# Patient Record
Sex: MALE | Race: ASIAN | URBAN - METROPOLITAN AREA
[De-identification: names, ages, dates, MRNs, and addresses within clinical notes are randomized per-mention and may not be internally consistent; named-entity substitution may affect disease eponyms.]

---

## 2022-02-18 ENCOUNTER — OFFICE VISIT (OUTPATIENT)
Dept: INTERNAL MEDICINE CLINIC | Facility: CLINIC | Age: 37
End: 2022-02-18
Payer: COMMERCIAL

## 2022-02-18 VITALS
SYSTOLIC BLOOD PRESSURE: 132 MMHG | BODY MASS INDEX: 26.67 KG/M2 | RESPIRATION RATE: 12 BRPM | DIASTOLIC BLOOD PRESSURE: 86 MMHG | WEIGHT: 178 LBS | TEMPERATURE: 99 F | HEIGHT: 68.5 IN | HEART RATE: 84 BPM

## 2022-02-18 DIAGNOSIS — Z23 NEED FOR VACCINATION: ICD-10-CM

## 2022-02-18 DIAGNOSIS — K76.0 FATTY LIVER: ICD-10-CM

## 2022-02-18 DIAGNOSIS — R74.8 ELEVATED LIVER ENZYMES: ICD-10-CM

## 2022-02-18 DIAGNOSIS — Z87.891 PERSONAL HISTORY OF TOBACCO USE, PRESENTING HAZARDS TO HEALTH: ICD-10-CM

## 2022-02-18 DIAGNOSIS — E78.5 HYPERLIPIDEMIA, UNSPECIFIED HYPERLIPIDEMIA TYPE: ICD-10-CM

## 2022-02-18 DIAGNOSIS — S16.1XXA STRAIN OF NECK MUSCLE, INITIAL ENCOUNTER: ICD-10-CM

## 2022-02-18 DIAGNOSIS — Z00.00 PHYSICAL EXAM, ANNUAL: Primary | ICD-10-CM

## 2022-02-18 PROCEDURE — 90472 IMMUNIZATION ADMIN EACH ADD: CPT | Performed by: INTERNAL MEDICINE

## 2022-02-18 PROCEDURE — 90471 IMMUNIZATION ADMIN: CPT | Performed by: INTERNAL MEDICINE

## 2022-02-18 PROCEDURE — 99385 PREV VISIT NEW AGE 18-39: CPT | Performed by: INTERNAL MEDICINE

## 2022-02-18 PROCEDURE — 99406 BEHAV CHNG SMOKING 3-10 MIN: CPT | Performed by: INTERNAL MEDICINE

## 2022-02-18 PROCEDURE — 3079F DIAST BP 80-89 MM HG: CPT | Performed by: INTERNAL MEDICINE

## 2022-02-18 PROCEDURE — 90746 HEPB VACCINE 3 DOSE ADULT IM: CPT | Performed by: INTERNAL MEDICINE

## 2022-02-18 PROCEDURE — 3008F BODY MASS INDEX DOCD: CPT | Performed by: INTERNAL MEDICINE

## 2022-02-18 PROCEDURE — 90715 TDAP VACCINE 7 YRS/> IM: CPT | Performed by: INTERNAL MEDICINE

## 2022-02-18 PROCEDURE — 3075F SYST BP GE 130 - 139MM HG: CPT | Performed by: INTERNAL MEDICINE

## 2022-02-27 LAB
ABSOLUTE BASOPHILS: 101 CELLS/UL (ref 0–200)
ABSOLUTE EOSINOPHILS: 455 CELLS/UL (ref 15–500)
ABSOLUTE LYMPHOCYTES: 4131 CELLS/UL (ref 850–3900)
ABSOLUTE MONOCYTES: 677 CELLS/UL (ref 200–950)
ABSOLUTE NEUTROPHILS: 4737 CELLS/UL (ref 1500–7800)
ALBUMIN/GLOBULIN RATIO: 1.6 (CALC) (ref 1–2.5)
ALBUMIN: 4.8 G/DL (ref 3.6–5.1)
ALKALINE PHOSPHATASE: 80 U/L (ref 36–130)
ALT: 34 U/L (ref 9–46)
AST: 21 U/L (ref 10–40)
BASOPHILS: 1 %
BILIRUBIN, TOTAL: 0.4 MG/DL (ref 0.2–1.2)
BUN: 13 MG/DL (ref 7–25)
CALCIUM: 10.4 MG/DL (ref 8.6–10.3)
CARBON DIOXIDE: 28 MMOL/L (ref 20–32)
CHLORIDE: 103 MMOL/L (ref 98–110)
CHOL/HDLC RATIO: 4.6 (CALC)
CREATININE: 0.87 MG/DL (ref 0.6–1.35)
EGFR IF AFRICN AM: 128 ML/MIN/1.73M2
EGFR IF NONAFRICN AM: 110 ML/MIN/1.73M2
EOSINOPHILS: 4.5 %
GLOBULIN: 3 G/DL (CALC) (ref 1.9–3.7)
GLUCOSE: 84 MG/DL (ref 65–99)
HDL CHOLESTEROL: 50 MG/DL
HEMATOCRIT: 49.3 % (ref 38.5–50)
HEMOGLOBIN A1C: 5.2 % OF TOTAL HGB
HEMOGLOBIN: 16.5 G/DL (ref 13.2–17.1)
LDL-CHOLESTEROL: 142 MG/DL (CALC)
LYMPHOCYTES: 40.9 %
MCH: 31 PG (ref 27–33)
MCHC: 33.5 G/DL (ref 32–36)
MCV: 92.7 FL (ref 80–100)
MONOCYTES: 6.7 %
MPV: 11.1 FL (ref 7.5–12.5)
NEUTROPHILS: 46.9 %
NON-HDL CHOLESTEROL: 178 MG/DL (CALC)
PLATELET COUNT: 310 THOUSAND/UL (ref 140–400)
POTASSIUM: 5.3 MMOL/L (ref 3.5–5.3)
PROTEIN, TOTAL: 7.8 G/DL (ref 6.1–8.1)
RDW: 12.6 % (ref 11–15)
RED BLOOD CELL COUNT: 5.32 MILLION/UL (ref 4.2–5.8)
SODIUM: 140 MMOL/L (ref 135–146)
TRIGLYCERIDES: 215 MG/DL
TSH W/REFLEX TO FT4: 1.09 MIU/L (ref 0.4–4.5)
WHITE BLOOD CELL COUNT: 10.1 THOUSAND/UL (ref 3.8–10.8)

## 2022-04-15 ENCOUNTER — NURSE ONLY (OUTPATIENT)
Dept: INTERNAL MEDICINE CLINIC | Facility: CLINIC | Age: 37
End: 2022-04-15
Payer: COMMERCIAL

## 2022-04-15 DIAGNOSIS — Z23 NEED FOR VACCINATION: Primary | ICD-10-CM

## 2022-04-15 PROCEDURE — 90746 HEPB VACCINE 3 DOSE ADULT IM: CPT | Performed by: INTERNAL MEDICINE

## 2022-04-15 PROCEDURE — 90471 IMMUNIZATION ADMIN: CPT | Performed by: INTERNAL MEDICINE

## 2022-11-02 ENCOUNTER — ORDER TRANSCRIPTION (OUTPATIENT)
Dept: ADMINISTRATIVE | Facility: HOSPITAL | Age: 37
End: 2022-11-02

## 2022-11-02 DIAGNOSIS — Z13.6 SCREENING FOR CARDIOVASCULAR CONDITION: Primary | ICD-10-CM

## 2022-11-06 ENCOUNTER — HOSPITAL ENCOUNTER (OUTPATIENT)
Dept: CT IMAGING | Age: 37
Discharge: HOME | End: 2022-11-06
Attending: INTERNAL MEDICINE

## 2022-11-06 DIAGNOSIS — Z13.6 SCREENING FOR CARDIOVASCULAR CONDITION: ICD-10-CM

## 2022-11-08 ENCOUNTER — ORDER TRANSCRIPTION (OUTPATIENT)
Dept: ADMINISTRATIVE | Facility: HOSPITAL | Age: 37
End: 2022-11-08

## 2022-11-08 DIAGNOSIS — Z13.9 ENCOUNTER FOR SCREENING: Primary | ICD-10-CM

## 2022-11-08 DIAGNOSIS — Z13.9 SCREENING PROCEDURE: Primary | ICD-10-CM

## 2023-02-17 ENCOUNTER — HOSPITAL ENCOUNTER (OUTPATIENT)
Dept: ULTRASOUND IMAGING | Age: 38
Discharge: HOME | End: 2023-02-17
Attending: INTERNAL MEDICINE

## 2023-02-17 DIAGNOSIS — Z13.6 SCREENING FOR CARDIOVASCULAR CONDITION: ICD-10-CM

## 2023-02-17 NOTE — PROGRESS NOTES
Date of Service 2/17/2023    Brenda Mock  Date of Birth 6/15/1985    Patient Age: 44year old    PCP: Alonzo Keen MD  [address] [id number]    Consult Type  Type Scan/Screening: PV Screening (Abdominal Aorta - Normal, Final result)        Left Carotid Artery: Normal (Final Result)  Right Carotid Artery: Normal (Final Result)       Body Mass Index  There is no height or weight on file to calculate BMI. Lipid Profile  Cholesterol: 228, done on 2/26/2022. HDL Cholesterol: 50, done on 2/26/2022. LDL Cholesterol: 142, done on 2/26/2022. TriGlycerides 215, done on 2/26/2022. He is due for his yearly physical and lab work. Nurse Review  Risk factor information and results reviewed with Nurse: Yes     Work on reducing your LDL and Triglycerides from your cholesterol - diet and exercise. Quit smoking completely and reduce the amount of alcohol. Recommended Follow Up:  Consult your physician regarding[de-identified] Final Peripheral Vascular Stroke Screen Report    No data recorded      Recommendations for Change:  Nutrition Changes: Low Saturated Fat;Low Fat Dairy  Cholesterol Modification (goal of therapy depends upon your risk): Decrease LDL (Lousy/Bad) Ideal <100;Decrease Triglycerides (Ugly) Normal <150  Exercise: Enhance Current Program  Smoking Cessation: Not Ready to Quit  Weight Management: Decrease Current Weight  Stress Management: Adopt Stress Management Techniques  Repeat Heart Scan: 5 years if Calcium Score is 0. 0; Discuss with your Physician  Repeat PV Screening:  (Could repeat this closer to 47years old.)       [facility] Recommended Resources:  Recommended Resources: [address] www. Health. Nicolle Jackson RN        Please Contact the Nurse Heart Line with any Questions or Concerns [phone].

## 2023-09-08 ENCOUNTER — OFFICE VISIT (OUTPATIENT)
Dept: INTERNAL MEDICINE CLINIC | Facility: CLINIC | Age: 38
End: 2023-09-08
Payer: COMMERCIAL

## 2023-09-08 VITALS
HEIGHT: 68.75 IN | SYSTOLIC BLOOD PRESSURE: 130 MMHG | DIASTOLIC BLOOD PRESSURE: 88 MMHG | RESPIRATION RATE: 20 BRPM | BODY MASS INDEX: 26.36 KG/M2 | OXYGEN SATURATION: 98 % | WEIGHT: 178 LBS | HEART RATE: 78 BPM | TEMPERATURE: 98 F

## 2023-09-08 DIAGNOSIS — Z00.00 PHYSICAL EXAM, ANNUAL: Primary | ICD-10-CM

## 2023-09-08 PROCEDURE — 3079F DIAST BP 80-89 MM HG: CPT | Performed by: INTERNAL MEDICINE

## 2023-09-08 PROCEDURE — 99395 PREV VISIT EST AGE 18-39: CPT | Performed by: INTERNAL MEDICINE

## 2023-09-08 PROCEDURE — 3075F SYST BP GE 130 - 139MM HG: CPT | Performed by: INTERNAL MEDICINE

## 2023-09-08 PROCEDURE — 3008F BODY MASS INDEX DOCD: CPT | Performed by: INTERNAL MEDICINE

## 2023-09-27 LAB
ABSOLUTE BASOPHILS: 60 CELLS/UL (ref 0–200)
ABSOLUTE EOSINOPHILS: 289 CELLS/UL (ref 15–500)
ABSOLUTE LYMPHOCYTES: 3706 CELLS/UL (ref 850–3900)
ABSOLUTE MONOCYTES: 697 CELLS/UL (ref 200–950)
ABSOLUTE NEUTROPHILS: 3749 CELLS/UL (ref 1500–7800)
ALBUMIN/GLOBULIN RATIO: 1.7 (CALC) (ref 1–2.5)
ALBUMIN: 4.7 G/DL (ref 3.6–5.1)
ALKALINE PHOSPHATASE: 75 U/L (ref 36–130)
ALT: 39 U/L (ref 9–46)
AST: 30 U/L (ref 10–40)
BASOPHILS: 0.7 %
BILIRUBIN, TOTAL: 0.7 MG/DL (ref 0.2–1.2)
BUN: 14 MG/DL (ref 7–25)
CALCIUM: 10.1 MG/DL (ref 8.6–10.3)
CARBON DIOXIDE: 31 MMOL/L (ref 20–32)
CHLORIDE: 104 MMOL/L (ref 98–110)
CHOL/HDLC RATIO: 4.8 (CALC)
CHOLESTEROL, TOTAL: 210 MG/DL
CREATININE: 0.95 MG/DL (ref 0.6–1.26)
EGFR: 104 ML/MIN/1.73M2
EOSINOPHILS: 3.4 %
GLOBULIN: 2.8 G/DL (CALC) (ref 1.9–3.7)
GLUCOSE: 85 MG/DL (ref 65–99)
HDL CHOLESTEROL: 44 MG/DL
HEMATOCRIT: 49.2 % (ref 38.5–50)
HEMOGLOBIN A1C: 5.1 % OF TOTAL HGB
HEMOGLOBIN: 16.2 G/DL (ref 13.2–17.1)
LDL-CHOLESTEROL: 127 MG/DL (CALC)
LYMPHOCYTES: 43.6 %
MCH: 30.3 PG (ref 27–33)
MCHC: 32.9 G/DL (ref 32–36)
MCV: 92 FL (ref 80–100)
MONOCYTES: 8.2 %
MPV: 12.2 FL (ref 7.5–12.5)
NEUTROPHILS: 44.1 %
NON-HDL CHOLESTEROL: 166 MG/DL (CALC)
PLATELET COUNT: 254 THOUSAND/UL (ref 140–400)
POTASSIUM: 4.9 MMOL/L (ref 3.5–5.3)
PROTEIN, TOTAL: 7.5 G/DL (ref 6.1–8.1)
RDW: 12.5 % (ref 11–15)
RED BLOOD CELL COUNT: 5.35 MILLION/UL (ref 4.2–5.8)
SODIUM: 142 MMOL/L (ref 135–146)
TRIGLYCERIDES: 240 MG/DL
TSH W/REFLEX TO FT4: 1.22 MIU/L (ref 0.4–4.5)
WHITE BLOOD CELL COUNT: 8.5 THOUSAND/UL (ref 3.8–10.8)

## 2024-09-13 ENCOUNTER — OFFICE VISIT (OUTPATIENT)
Dept: INTERNAL MEDICINE CLINIC | Facility: CLINIC | Age: 39
End: 2024-09-13
Payer: COMMERCIAL

## 2024-09-13 VITALS
WEIGHT: 166 LBS | DIASTOLIC BLOOD PRESSURE: 60 MMHG | OXYGEN SATURATION: 98 % | HEART RATE: 64 BPM | RESPIRATION RATE: 16 BRPM | SYSTOLIC BLOOD PRESSURE: 110 MMHG | BODY MASS INDEX: 24.59 KG/M2 | HEIGHT: 69 IN

## 2024-09-13 DIAGNOSIS — M94.0 COSTOCHONDRITIS: ICD-10-CM

## 2024-09-13 DIAGNOSIS — Z01.84 IMMUNITY STATUS TESTING: ICD-10-CM

## 2024-09-13 DIAGNOSIS — Z00.00 PHYSICAL EXAM, ANNUAL: Primary | ICD-10-CM

## 2024-09-13 PROCEDURE — 99213 OFFICE O/P EST LOW 20 MIN: CPT | Performed by: INTERNAL MEDICINE

## 2024-09-13 PROCEDURE — 99396 PREV VISIT EST AGE 40-64: CPT | Performed by: INTERNAL MEDICINE

## 2024-09-13 NOTE — PROGRESS NOTES
[facility]    CHIEF COMPLAINT:   Chief Complaint   Patient presents with    Physical     Eye exam done          HPI:   Francesco Polanco is a 39 year old male who presents for a complete physical exam.     Up to date tdap.   Get covid booster at his local pharmacy.     Exercise: healthclub, weights    Derm: no skin concerns.     Has a history of hyperlipidemia. Diet controlled.   Exercises off and on, nothing regular.      Smokes about 2-3 cigarettes a day. Has for 15 years. Stops off and on, does not smoke around kids.   Drinking 2-3 drinks 2-3 times a week.     Calcium score 0 in 11/2022    Complains of pain in right lower chest wall every so often. Usually a sharp pain when he moves in a certain direction or changes position in bed. Lasts a second or two and goes away. No pain when exercising. No pain today.     Wt Readings from Last 6 Encounters:   09/13/24 166 lb (75.3 kg)   09/08/23 178 lb (80.7 kg)   02/18/22 178 lb (80.7 kg)     Body mass index is 24.51 kg/m².       No current outpatient medications on file.      Past Medical History:    Hyperlipidemia    diet controlled      Past Surgical History:   Procedure Laterality Date    Other surgical history  [id number]    surgery on left humerus. Had plate placed and now removed      No family history on file.   Social History:   Social History     Socioeconomic History    Marital status:    Occupational History    Occupation: IT   Tobacco Use    Smoking status: Every Day     Current packs/day: 0.20     Average packs/day: 0.2 packs/day for 15.0 years (3.0 ttl pk-yrs)     Types: Cigarettes    Smokeless tobacco: Never    Tobacco comments:     2-3 ciggaretes a day for 15 years   Vaping Use    Vaping status: Never Used   Substance and Sexual Activity    Alcohol use: Yes     Comment: approx 2-3 drinks 2-3 times per week    Drug use: Never   Other Topics Concern    Exercise Yes     Comment: irregular exercise     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    Exercise:  weights .  Diet: watches minimally     REVIEW OF SYSTEMS:   GENERAL: feels well otherwise  SKIN: denies any unusual skin lesions  EYES:denies blurred vision or double vision  HEENT: denies nasal congestion, sinus pain or ST  LUNGS: denies shortness of breath with exertion  CARDIOVASCULAR: denies chest pain on exertion  GI: denies abdominal pain,denies heartburn  : denies dysuria  MUSCULOSKELETAL: denies back pain  NEURO: denies headaches  PSYCHE: denies depression or anxiety  HEMATOLOGIC: denies hx of anemia  ENDOCRINE: denies thyroid history  ALL/ASTHMA: denies hx of allergy or asthma  EXAM:   /60 (BP Location: Right arm, Patient Position: Sitting, Cuff Size: adult)   Pulse 64   Resp 16   Ht 5' 9\" (1.753 m)   Wt 166 lb (75.3 kg)   SpO2 98%   BMI 24.51 kg/m²   Body mass index is 24.51 kg/m².   GENERAL: well developed, well nourished,in no apparent distress  SKIN: no rashes,no suspicious lesions  HEENT: atraumatic, normocephalic,ears and throat are clear  EYES:PERRLA, conjunctiva are clear  NECK: supple,no adenopathy,no bruits  CHEST: has reproducible tenderness to palpation of right lower rib just adjacent to the lower end of the sternum.   LUNGS: clear to auscultation  CARDIO: nl s1 and s2, RRR without murmur  GI: good BS's,no masses, HSM or tenderness  GENITAL/URINARY:  deferred. No symptoms.   MUSCULOSKELETAL: back is not tender,FROM of the back  EXTREMITIES: no cyanosis, clubbing or edema  NEURO: Oriented times three,cranial nerves are intact,motor and sensory are grossly intact  Labs:   Lab Results   Component Value Date/Time    WBC 8.5 09/26/2023 06:58 AM    HGB 16.2 09/26/2023 06:58 AM     09/26/2023 06:58 AM      Lab Results   Component Value Date/Time    GLU 85 09/26/2023 06:58 AM     09/26/2023 06:58 AM    K 4.9 09/26/2023 06:58 AM     09/26/2023 06:58 AM    CO2 31 09/26/2023 06:58 AM    CREATSERUM 0.95 09/26/2023 06:58 AM    CA 10.1 09/26/2023 06:58 AM    ALB  4.7 09/26/2023 06:58 AM    TP 7.5 09/26/2023 06:58 AM    ALKPHO 75 09/26/2023 06:58 AM    AST 30 09/26/2023 06:58 AM    ALT 39 09/26/2023 06:58 AM    BILT 0.7 09/26/2023 06:58 AM        Lab Results   Component Value Date/Time    CHOLEST 210 (H) 09/26/2023 06:58 AM    HDL 44 09/26/2023 06:58 AM    TRIG 240 (H) 09/26/2023 06:58 AM     (H) 09/26/2023 06:58 AM    NONHDLC 166 (H) 09/26/2023 06:58 AM       Lab Results   Component Value Date/Time    A1C 5.1 09/26/2023 06:58 AM      Vitamin D:    No results found for: \"VITD\"        ASSESSMENT AND PLAN:   Francesco Polanco is a 39 year old male who presents for a complete physical exam.   1. Physical exam, annual  Do labs.   Immunizations discussed.   Continue regular exercise.   Discussed tobacco cessation and alcohol control.   - CBC With Differential With Platelet  - Comp Metabolic Panel  - Lipid Panel  - TSH W Reflex To Free T4    2. Immunity status testing  - Hepatitis B Surface Antibody [E]    3. Costochondritis  Likely the cause of his fleeting right chest wall pain that is positional.   Discussed conservative management. Okay to take otc pain meds if needed.   Rtc if worsening.       Return in about 1 year (around 9/13/2025) for physical.      Rekha Vee MD

## 2024-10-10 LAB
ABSOLUTE BASOPHILS: 98 CELLS/UL (ref 0–200)
ABSOLUTE EOSINOPHILS: 196 CELLS/UL (ref 15–500)
ABSOLUTE LYMPHOCYTES: 3649 CELLS/UL (ref 850–3900)
ABSOLUTE MONOCYTES: 801 CELLS/UL (ref 200–950)
ABSOLUTE NEUTROPHILS: 4156 CELLS/UL (ref 1500–7800)
ALBUMIN/GLOBULIN RATIO: 2.1 (CALC) (ref 1–2.5)
ALBUMIN: 4.9 G/DL (ref 3.6–5.1)
ALKALINE PHOSPHATASE: 69 U/L (ref 36–130)
ALT: 28 U/L (ref 9–46)
AST: 22 U/L (ref 10–40)
BASOPHILS: 1.1 %
BILIRUBIN, TOTAL: 1 MG/DL (ref 0.2–1.2)
BUN: 14 MG/DL (ref 7–25)
CALCIUM: 10 MG/DL (ref 8.6–10.3)
CARBON DIOXIDE: 27 MMOL/L (ref 20–32)
CHLORIDE: 103 MMOL/L (ref 98–110)
CHOL/HDLC RATIO: 4.9 (CALC)
CHOLESTEROL, TOTAL: 227 MG/DL
CREATININE: 0.94 MG/DL (ref 0.6–1.29)
EGFR: 105 ML/MIN/1.73M2
EOSINOPHILS: 2.2 %
GLOBULIN: 2.3 G/DL (CALC) (ref 1.9–3.7)
GLUCOSE: 83 MG/DL (ref 65–99)
HDL CHOLESTEROL: 46 MG/DL
HEMATOCRIT: 52 % (ref 38.5–50)
HEMOGLOBIN: 17.3 G/DL (ref 13.2–17.1)
HEPATITIS B SURFACE$ANTIBODY QL: REACTIVE
LDL-CHOLESTEROL: 146 MG/DL (CALC)
LYMPHOCYTES: 41 %
MCH: 32 PG (ref 27–33)
MCHC: 33.3 G/DL (ref 32–36)
MCV: 96.3 FL (ref 80–100)
MONOCYTES: 9 %
MPV: 11.5 FL (ref 7.5–12.5)
NEUTROPHILS: 46.7 %
NON-HDL CHOLESTEROL: 181 MG/DL (CALC)
PLATELET COUNT: 275 THOUSAND/UL (ref 140–400)
POTASSIUM: 4.8 MMOL/L (ref 3.5–5.3)
PROTEIN, TOTAL: 7.2 G/DL (ref 6.1–8.1)
RDW: 12 % (ref 11–15)
RED BLOOD CELL COUNT: 5.4 MILLION/UL (ref 4.2–5.8)
SODIUM: 140 MMOL/L (ref 135–146)
TRIGLYCERIDES: 209 MG/DL
TSH W/REFLEX TO FT4: 1.37 MIU/L (ref 0.4–4.5)
WHITE BLOOD CELL COUNT: 8.9 THOUSAND/UL (ref 3.8–10.8)

## 2024-10-12 ENCOUNTER — PATIENT MESSAGE (OUTPATIENT)
Dept: INTERNAL MEDICINE CLINIC | Facility: CLINIC | Age: 39
End: 2024-10-12

## (undated) DIAGNOSIS — E83.52 SERUM CALCIUM ELEVATED: Primary | ICD-10-CM